# Patient Record
Sex: MALE | Race: WHITE | NOT HISPANIC OR LATINO | Employment: OTHER | URBAN - METROPOLITAN AREA
[De-identification: names, ages, dates, MRNs, and addresses within clinical notes are randomized per-mention and may not be internally consistent; named-entity substitution may affect disease eponyms.]

---

## 2017-02-27 ENCOUNTER — LAB (OUTPATIENT)
Dept: INTERNAL MEDICINE | Facility: CLINIC | Age: 67
End: 2017-02-27

## 2017-02-27 DIAGNOSIS — E55.9 VITAMIN D DEFICIENCY: ICD-10-CM

## 2017-02-27 DIAGNOSIS — I10 ESSENTIAL HYPERTENSION: Primary | ICD-10-CM

## 2017-02-27 DIAGNOSIS — I10 ESSENTIAL HYPERTENSION: ICD-10-CM

## 2017-02-27 DIAGNOSIS — E07.9 THYROID DISORDER: ICD-10-CM

## 2017-02-27 DIAGNOSIS — R73.9 HYPERGLYCEMIA: ICD-10-CM

## 2017-02-27 DIAGNOSIS — D64.9 CHRONIC ANEMIA: ICD-10-CM

## 2017-02-28 LAB
25(OH)D3+25(OH)D2 SERPL-MCNC: 58 NG/ML
ALBUMIN SERPL-MCNC: 3.8 G/DL (ref 3.5–5.2)
ALBUMIN/CREAT UR: <3.4 MG/G CREAT (ref 0–30)
ALBUMIN/GLOB SERPL: 1.7 G/DL
ALP SERPL-CCNC: 62 U/L (ref 40–129)
ALT SERPL-CCNC: 10 U/L (ref 5–41)
AST SERPL-CCNC: 18 U/L (ref 5–40)
BASOPHILS # BLD AUTO: 0.03 10*3/MM3 (ref 0–0.2)
BASOPHILS NFR BLD AUTO: 0.9 % (ref 0–2)
BILIRUB SERPL-MCNC: 0.6 MG/DL (ref 0.2–1.2)
BUN SERPL-MCNC: 15 MG/DL (ref 8–23)
BUN/CREAT SERPL: 16 (ref 7–25)
CALCIUM SERPL-MCNC: 9.2 MG/DL (ref 8.8–10.5)
CHLORIDE SERPL-SCNC: 102 MMOL/L (ref 98–107)
CHOLEST SERPL-MCNC: 142 MG/DL (ref 0–200)
CO2 SERPL-SCNC: 29.6 MMOL/L (ref 22–29)
CREAT SERPL-MCNC: 0.94 MG/DL (ref 0.76–1.27)
CREAT UR-MCNC: 88.8 MG/DL
EOSINOPHIL # BLD AUTO: 0.2 10*3/MM3 (ref 0.1–0.3)
EOSINOPHIL NFR BLD AUTO: 6.3 % (ref 0–4)
ERYTHROCYTE [DISTWIDTH] IN BLOOD BY AUTOMATED COUNT: 12.8 % (ref 11.5–14.5)
GLOBULIN SER CALC-MCNC: 2.3 GM/DL
GLUCOSE SERPL-MCNC: 92 MG/DL (ref 65–99)
HBA1C MFR BLD: 4.9 % (ref 4.8–5.6)
HCT VFR BLD AUTO: 35.3 % (ref 42–52)
HDLC SERPL-MCNC: 74 MG/DL (ref 40–60)
HGB BLD-MCNC: 12.3 G/DL (ref 14–18)
IMM GRANULOCYTES # BLD: 0.01 10*3/MM3 (ref 0–0.03)
IMM GRANULOCYTES NFR BLD: 0.3 % (ref 0–0.5)
LDLC SERPL CALC-MCNC: 61 MG/DL (ref 0–100)
LDLC/HDLC SERPL: 0.83 {RATIO}
LYMPHOCYTES # BLD AUTO: 1.55 10*3/MM3 (ref 0.6–4.8)
LYMPHOCYTES NFR BLD AUTO: 48.6 % (ref 20–45)
MCH RBC QN AUTO: 30.1 PG (ref 27–31)
MCHC RBC AUTO-ENTMCNC: 34.8 G/DL (ref 31–37)
MCV RBC AUTO: 86.5 FL (ref 80–94)
MICROALBUMIN UR-MCNC: <3 UG/ML
MONOCYTES # BLD AUTO: 0.17 10*3/MM3 (ref 0–1)
MONOCYTES NFR BLD AUTO: 5.3 % (ref 3–8)
NEUTROPHILS # BLD AUTO: 1.23 10*3/MM3 (ref 1.5–8.3)
NEUTROPHILS NFR BLD AUTO: 38.6 % (ref 45–70)
NRBC BLD AUTO-RTO: 0 /100 WBC (ref 0–0)
PLATELET # BLD AUTO: 232 10*3/MM3 (ref 140–500)
POTASSIUM SERPL-SCNC: 3.8 MMOL/L (ref 3.5–5.2)
PROT SERPL-MCNC: 6.1 G/DL (ref 6–8.5)
RBC # BLD AUTO: 4.08 10*6/MM3 (ref 4.7–6.1)
SODIUM SERPL-SCNC: 143 MMOL/L (ref 136–145)
T4 FREE SERPL-MCNC: 1.12 NG/DL (ref 0.93–1.7)
TRIGL SERPL-MCNC: 34 MG/DL (ref 0–150)
TSH SERPL DL<=0.005 MIU/L-ACNC: 0.72 MIU/ML (ref 0.27–4.2)
VIT B12 SERPL-MCNC: 942 PG/ML (ref 211–946)
VLDLC SERPL CALC-MCNC: 6.8 MG/DL (ref 8–32)
WBC # BLD AUTO: 3.19 10*3/MM3 (ref 4.8–10.8)

## 2017-03-02 ENCOUNTER — TELEPHONE (OUTPATIENT)
Dept: INTERNAL MEDICINE | Facility: CLINIC | Age: 67
End: 2017-03-02

## 2017-03-02 NOTE — TELEPHONE ENCOUNTER
----- Message from Laura Beebe MD sent at 3/2/2017  8:21 AM EST -----  Labs ok. Will discuss at upcoming appt.

## 2017-03-06 ENCOUNTER — OFFICE VISIT (OUTPATIENT)
Dept: INTERNAL MEDICINE | Facility: CLINIC | Age: 67
End: 2017-03-06

## 2017-03-06 VITALS
DIASTOLIC BLOOD PRESSURE: 82 MMHG | RESPIRATION RATE: 16 BRPM | HEIGHT: 69 IN | TEMPERATURE: 98 F | SYSTOLIC BLOOD PRESSURE: 138 MMHG | WEIGHT: 151 LBS | OXYGEN SATURATION: 98 % | HEART RATE: 79 BPM | BODY MASS INDEX: 22.36 KG/M2

## 2017-03-06 DIAGNOSIS — I10 ESSENTIAL HYPERTENSION: ICD-10-CM

## 2017-03-06 DIAGNOSIS — G47.09 OTHER INSOMNIA: ICD-10-CM

## 2017-03-06 DIAGNOSIS — Z00.00 HEALTHCARE MAINTENANCE: ICD-10-CM

## 2017-03-06 DIAGNOSIS — Z87.891 FORMER SMOKER: Primary | ICD-10-CM

## 2017-03-06 PROCEDURE — 99214 OFFICE O/P EST MOD 30 MIN: CPT | Performed by: INTERNAL MEDICINE

## 2017-03-06 RX ORDER — HYDROCHLOROTHIAZIDE 25 MG/1
25 TABLET ORAL DAILY
Qty: 90 TABLET | Refills: 3 | Status: SHIPPED | OUTPATIENT
Start: 2017-03-06

## 2017-03-06 RX ORDER — OLMESARTAN MEDOXOMIL 20 MG/1
10 TABLET ORAL DAILY
Qty: 45 TABLET | Refills: 3 | Status: SHIPPED | OUTPATIENT
Start: 2017-03-06

## 2017-03-06 RX ORDER — CLONAZEPAM 1 MG/1
1 TABLET ORAL NIGHTLY PRN
Qty: 30 TABLET | Refills: 0 | Status: SHIPPED | OUTPATIENT
Start: 2017-03-06 | End: 2017-04-02 | Stop reason: SDUPTHER

## 2017-03-06 NOTE — PROGRESS NOTES
Subjective     Topher Glover is a 66 y.o. male, who presents with a chief complaint of   Chief Complaint   Patient presents with   • Anxiety     med check   • Anemia     review labs   • Hypertension   • Hyperglycemia       HPI   The pt is here for follow up    Insomnia - the pt has tried trazodone, lunesta, silenor, and ambien with no help.  The pt is willing to try something else but is very nervous about not taking clonazepam.   He has issues falling asleep and staying asleep.  He walks, run, and rides a bike for exercise.  No known chronic snoring.       HTN - bp well controlled on benicar and hctz.  No ha/dizziness.       Chronic anemia - he tried iron in the past but it just caused constipation.     Past hx hyperglycemia - labs now normal.      The following portions of the patient's history were reviewed and updated as appropriate: allergies, current medications, past family history, past medical history, past social history, past surgical history and problem list.    Allergies: Oxycodone; Penicillins; and Norco [hydrocodone-acetaminophen]    Review of Systems   Constitutional: Negative.    HENT: Negative.    Eyes: Negative.    Respiratory: Negative.    Cardiovascular: Negative.    Gastrointestinal: Negative.    Endocrine: Negative.    Genitourinary: Negative.    Musculoskeletal: Negative.    Skin: Negative.    Allergic/Immunologic: Negative.    Neurological: Negative.    Hematological: Negative.    Psychiatric/Behavioral: Positive for sleep disturbance.   All other systems reviewed and are negative.      Objective     Wt Readings from Last 3 Encounters:   03/06/17 151 lb (68.5 kg)   08/23/16 145 lb 12.8 oz (66.1 kg)   06/28/16 148 lb 12.8 oz (67.5 kg)     Temp Readings from Last 3 Encounters:   03/06/17 98 °F (36.7 °C) (Oral)   06/28/16 98.2 °F (36.8 °C)   10/13/15 98.3 °F (36.8 °C)     BP Readings from Last 3 Encounters:   03/06/17 138/82   08/23/16 100/70   06/28/16 120/88     Pulse Readings from Last 3  Encounters:   03/06/17 79   08/23/16 69   06/28/16 61     Body mass index is 22.3 kg/(m^2).  SpO2 Readings from Last 3 Encounters:   03/06/17 98%   08/23/16 100%   06/28/16 100%       Physical Exam   Constitutional: He is oriented to person, place, and time. He appears well-developed and well-nourished. No distress.   HENT:   Head: Normocephalic and atraumatic.   Right Ear: External ear normal.   Left Ear: External ear normal.   Nose: Nose normal.   Mouth/Throat: Oropharynx is clear and moist.   Eyes: Conjunctivae and EOM are normal. Pupils are equal, round, and reactive to light.   Neck: Normal range of motion. Neck supple.   Cardiovascular: Normal rate, regular rhythm, normal heart sounds and intact distal pulses.    Pulmonary/Chest: Effort normal and breath sounds normal. No respiratory distress. He has no wheezes.   Musculoskeletal: Normal range of motion.   Normal gait   Neurological: He is alert and oriented to person, place, and time.   Skin: Skin is warm and dry.   Psychiatric: He has a normal mood and affect. His behavior is normal. Judgment and thought content normal.   Nursing note and vitals reviewed.      Results for orders placed or performed in visit on 02/27/17   Comprehensive metabolic panel   Result Value Ref Range    Glucose 92 65 - 99 mg/dL    BUN 15 8 - 23 mg/dL    Creatinine 0.94 0.76 - 1.27 mg/dL    eGFR Non African Am 80 >60 mL/min/1.73    eGFR African Am 97 >60 mL/min/1.73    BUN/Creatinine Ratio 16.0 7.0 - 25.0    Sodium 143 136 - 145 mmol/L    Potassium 3.8 3.5 - 5.2 mmol/L    Chloride 102 98 - 107 mmol/L    Total CO2 29.6 (H) 22.0 - 29.0 mmol/L    Calcium 9.2 8.8 - 10.5 mg/dL    Total Protein 6.1 6.0 - 8.5 g/dL    Albumin 3.80 3.50 - 5.20 g/dL    Globulin 2.3 gm/dL    A/G Ratio 1.7 g/dL    Total Bilirubin 0.6 0.2 - 1.2 mg/dL    Alkaline Phosphatase 62 40 - 129 U/L    AST (SGOT) 18 5 - 40 U/L    ALT (SGPT) 10 5 - 41 U/L   Lipid Panel With LDL/HDL Ratio   Result Value Ref Range    Total  Cholesterol 142 0 - 200 mg/dL    Triglycerides 34 0 - 150 mg/dL    HDL Cholesterol 74 (H) 40 - 60 mg/dL    VLDL Cholesterol 6.8 (L) 8 - 32 mg/dL    LDL Cholesterol  61 0 - 100 mg/dL    LDL/HDL Ratio 0.83    Hemoglobin A1c   Result Value Ref Range    Hemoglobin A1C 4.90 4.80 - 5.60 %   Microalbumin / creatinine, urine ratio   Result Value Ref Range    Creatinine, Urine 88.8 Not Estab. mg/dL    Microalbumin, Urine <3.0 Not Estab. ug/mL    Microalbumin/Creatinine Ratio Urine <3.4 0.0 - 30.0 mg/g creat   TSH   Result Value Ref Range    TSH 0.718 0.270 - 4.200 mIU/mL   T4, free   Result Value Ref Range    Free T4 1.12 0.93 - 1.70 ng/dL   Vitamin D 25 hydroxy   Result Value Ref Range    25 Hydroxy, Vitamin D 58.0 ng/mL   Vitamin B12   Result Value Ref Range    Vitamin B-12 942 211 - 946 pg/mL   CBC w AUTO Differential   Result Value Ref Range    WBC 3.19 (L) 4.80 - 10.80 10*3/mm3    RBC 4.08 (L) 4.70 - 6.10 10*6/mm3    Hemoglobin 12.3 (L) 14.0 - 18.0 g/dL    Hematocrit 35.3 (L) 42.0 - 52.0 %    MCV 86.5 80.0 - 94.0 fL    MCH 30.1 27.0 - 31.0 pg    MCHC 34.8 31.0 - 37.0 g/dL    RDW 12.8 11.5 - 14.5 %    Platelets 232 140 - 500 10*3/mm3    Neutrophil Rel % 38.6 (L) 45.0 - 70.0 %    Lymphocyte Rel % 48.6 (H) 20.0 - 45.0 %    Monocyte Rel % 5.3 3.0 - 8.0 %    Eosinophil Rel % 6.3 (H) 0.0 - 4.0 %    Basophil Rel % 0.9 0.0 - 2.0 %    Neutrophils Absolute 1.23 (L) 1.50 - 8.30 10*3/mm3    Lymphocytes Absolute 1.55 0.60 - 4.80 10*3/mm3    Monocytes Absolute 0.17 0.00 - 1.00 10*3/mm3    Eosinophils Absolute 0.20 0.10 - 0.30 10*3/mm3    Basophils Absolute 0.03 0.00 - 0.20 10*3/mm3    Immature Granulocyte Rel % 0.3 0.0 - 0.5 %    Immature Grans Absolute 0.01 0.00 - 0.03 10*3/mm3    nRBC 0.0 0.0 - 0.0 /100 WBC       Assessment/Plan   Topher was seen today for anxiety, anemia, hypertension and hyperglycemia.    Diagnoses and all orders for this visit:    Former smoker  -     Abdominal Aortic Aneurysm Screening Medicare CAR;  Future    Other insomnia - discussed need for benzodiazepine wean in future.  Trial of belsomra.    -     Suvorexant (BELSOMRA) 15 MG tablet; Take 1 tablet by mouth every night at bedtime.  -     clonazePAM (KlonoPIN) 1 MG tablet; Take 1 tablet by mouth At Night As Needed (sleep).  -     Suvorexant (BELSOMRA) 20 MG tablet; Take 1 tablet by mouth At Night As Needed (sleep).    Healthcare maintenance  -     Abdominal Aortic Aneurysm Screening Medicare CAR; Future    Essential hypertension  -     Abdominal Aortic Aneurysm Screening Medicare CAR; Future     pt requests print out off all medication prescriptions    Outpatient Medications Prior to Visit   Medication Sig Dispense Refill   • famotidine (PEPCID) 40 MG tablet Take 40 mg by mouth daily.     • valACYclovir (VALTREX) 500 MG tablet Take 500 mg by mouth as needed.     • clonazePAM (KlonoPIN) 1 MG tablet TAKE ONE TABLET BY MOUTH EVERY NIGHT AT BEDTIME AS NEEDED 30 tablet 2   • hydrochlorothiazide (HYDRODIURIL) 25 MG tablet Take 1 tablet by mouth daily. 90 tablet 3   • olmesartan (BENICAR) 20 MG tablet Take 0.5 tablets by mouth daily. 45 tablet 3     No facility-administered medications prior to visit.      New Medications Ordered This Visit   Medications   • Suvorexant (BELSOMRA) 15 MG tablet     Sig: Take 1 tablet by mouth every night at bedtime.     Dispense:  10 tablet     Refill:  0   • clonazePAM (KlonoPIN) 1 MG tablet     Sig: Take 1 tablet by mouth At Night As Needed (sleep).     Dispense:  30 tablet     Refill:  0     Discussed need to wean med with patient.   • Suvorexant (BELSOMRA) 20 MG tablet     Sig: Take 1 tablet by mouth At Night As Needed (sleep).     Dispense:  10 tablet     Refill:  0   • hydrochlorothiazide (HYDRODIURIL) 25 MG tablet     Sig: Take 1 tablet by mouth Daily.     Dispense:  90 tablet     Refill:  3   • olmesartan (BENICAR) 20 MG tablet     Sig: Take 0.5 tablets by mouth Daily.     Dispense:  45 tablet     Refill:  3       Medications  Discontinued During This Encounter   Medication Reason   • clonazePAM (KlonoPIN) 1 MG tablet Reorder   • hydrochlorothiazide (HYDRODIURIL) 25 MG tablet Reorder   • olmesartan (BENICAR) 20 MG tablet Reorder       Return in about 3 months (around 6/6/2017) for Recheck.

## 2017-03-13 ENCOUNTER — TRANSCRIBE ORDERS (OUTPATIENT)
Dept: ADMINISTRATIVE | Facility: HOSPITAL | Age: 67
End: 2017-03-13

## 2017-03-13 DIAGNOSIS — N45.1 EPIDIDYMITIS: ICD-10-CM

## 2017-03-13 DIAGNOSIS — N50.819 TESTICULAR PAIN: Primary | ICD-10-CM

## 2017-04-02 DIAGNOSIS — G47.09 OTHER INSOMNIA: ICD-10-CM

## 2017-04-03 ENCOUNTER — HOSPITAL ENCOUNTER (OUTPATIENT)
Dept: ULTRASOUND IMAGING | Facility: HOSPITAL | Age: 67
Discharge: HOME | End: 2017-04-03
Admitting: NURSE PRACTITIONER

## 2017-04-03 ENCOUNTER — HOSPITAL ENCOUNTER (OUTPATIENT)
Dept: ULTRASOUND IMAGING | Facility: HOSPITAL | Age: 67
Discharge: HOME | End: 2017-04-03

## 2017-04-03 DIAGNOSIS — N50.819 TESTICULAR PAIN: ICD-10-CM

## 2017-04-03 DIAGNOSIS — N45.1 EPIDIDYMITIS: ICD-10-CM

## 2017-04-03 PROCEDURE — 93976 VASCULAR STUDY: CPT

## 2017-04-03 PROCEDURE — 76870 US EXAM SCROTUM: CPT

## 2017-04-03 RX ORDER — CLONAZEPAM 1 MG/1
TABLET ORAL
Qty: 30 TABLET | Refills: 0 | OUTPATIENT
Start: 2017-04-03 | End: 2017-04-30 | Stop reason: SDUPTHER

## 2017-04-30 DIAGNOSIS — G47.09 OTHER INSOMNIA: ICD-10-CM

## 2017-05-01 RX ORDER — CLONAZEPAM 1 MG/1
TABLET ORAL
Qty: 30 TABLET | Refills: 2 | OUTPATIENT
Start: 2017-05-01 | End: 2021-07-01

## 2021-07-01 ENCOUNTER — HOSPITAL ENCOUNTER (OUTPATIENT)
Dept: GENERAL RADIOLOGY | Facility: HOSPITAL | Age: 71
Discharge: HOME | End: 2021-07-01
Admitting: NURSE PRACTITIONER

## 2021-07-01 DIAGNOSIS — R05.8 COUGH WITH EXPECTORATION: ICD-10-CM

## 2021-07-01 PROCEDURE — 71046 X-RAY EXAM CHEST 2 VIEWS: CPT

## 2024-08-16 ENCOUNTER — LAB REQUISITION (OUTPATIENT)
Dept: LAB | Facility: HOSPITAL | Age: 74
End: 2024-08-16
Payer: COMMERCIAL

## 2024-08-16 DIAGNOSIS — J32.9 CHRONIC SINUSITIS, UNSPECIFIED: ICD-10-CM

## 2024-08-16 PROCEDURE — 88305 TISSUE EXAM BY PATHOLOGIST: CPT | Performed by: OTOLARYNGOLOGY

## 2024-08-21 LAB
CYTO UR: NORMAL
DX PRELIMINARY: NORMAL
LAB AP CASE REPORT: NORMAL
LAB AP DIAGNOSIS COMMENT: NORMAL
PATH REPORT.FINAL DX SPEC: NORMAL
PATH REPORT.GROSS SPEC: NORMAL